# Patient Record
Sex: MALE | Race: WHITE
[De-identification: names, ages, dates, MRNs, and addresses within clinical notes are randomized per-mention and may not be internally consistent; named-entity substitution may affect disease eponyms.]

---

## 2018-01-22 ENCOUNTER — HOSPITAL ENCOUNTER (EMERGENCY)
Dept: HOSPITAL 26 - MED | Age: 78
Discharge: HOME | End: 2018-01-22
Payer: COMMERCIAL

## 2018-01-22 VITALS — WEIGHT: 162.25 LBS | HEIGHT: 65 IN | BODY MASS INDEX: 27.03 KG/M2

## 2018-01-22 VITALS — SYSTOLIC BLOOD PRESSURE: 128 MMHG | DIASTOLIC BLOOD PRESSURE: 87 MMHG

## 2018-01-22 VITALS — SYSTOLIC BLOOD PRESSURE: 198 MMHG | DIASTOLIC BLOOD PRESSURE: 92 MMHG
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
## 2018-01-22 DIAGNOSIS — I10: ICD-10-CM
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
## 2018-01-22 DIAGNOSIS — I48.91: ICD-10-CM

## 2018-01-22 DIAGNOSIS — M54.5: Primary | ICD-10-CM
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
## 2018-01-22 PROCEDURE — 99284 EMERGENCY DEPT VISIT MOD MDM: CPT

## 2018-01-22 PROCEDURE — 96372 THER/PROPH/DIAG INJ SC/IM: CPT

## 2018-01-22 PROCEDURE — 72100 X-RAY EXAM L-S SPINE 2/3 VWS: CPT

## 2018-01-22 NOTE — NUR
77/M BIB FAMILY C/O MID BACK PAIN 10/10 POST TRIP AND FALL 1/21/18,  DENIES 
LOC.DENIES N/V. AAOX4. LUNGS CLEAR BL; PATIENT STATES PAIN OF 10/10 AT THIS 
TIME; PATIENT POSITIONED FOR COMFORT; HOB ELEVATED; BEDRAILS UP X2; BED DOWN. 
ER MD MADE AWARE OF PT STATUS.

## 2018-01-22 NOTE — NUR
Patient discharged with v/s stable. Written and verbal after care instructions 
given and explained. 

Patient alert, oriented and verbalized understanding of instructions. 
Ambulatory with steady gait. All questions addressed prior to discharge. ID 
band removed. Patient advised to follow up with PMD. Rx of tramadol, bobaxin & 
motrin given. Patient educated on indication of medication including possible 
reaction and side effects. Opportunity to ask questions provided and answered.

## 2018-12-22 ENCOUNTER — HOSPITAL ENCOUNTER (EMERGENCY)
Dept: HOSPITAL 26 - MED | Age: 78
Discharge: HOME | End: 2018-12-22
Payer: COMMERCIAL

## 2018-12-22 VITALS — DIASTOLIC BLOOD PRESSURE: 73 MMHG | SYSTOLIC BLOOD PRESSURE: 130 MMHG

## 2018-12-22 VITALS — BODY MASS INDEX: 28 KG/M2 | WEIGHT: 158 LBS | HEIGHT: 63 IN

## 2018-12-22 DIAGNOSIS — I10: ICD-10-CM

## 2018-12-22 DIAGNOSIS — I48.91: ICD-10-CM

## 2018-12-22 DIAGNOSIS — M75.41: Primary | ICD-10-CM

## 2018-12-22 PROCEDURE — 99283 EMERGENCY DEPT VISIT LOW MDM: CPT

## 2018-12-22 PROCEDURE — 73030 X-RAY EXAM OF SHOULDER: CPT

## 2018-12-22 PROCEDURE — 96372 THER/PROPH/DIAG INJ SC/IM: CPT

## 2018-12-22 NOTE — NUR
c/o persistant right shoulder pain x2 wks notale soft tissue swelling right 
clavicular/neck area

no subcutaneous Emphysema palpable

denies recent injury---unable to lift rue about chest level. DENIES N/V/D; SKIN 
IS PINK/WARM/DRY; AAOX4 WITH EVEN AND STEADY GAIT; LUNGS CLEAR BL; HR EVEN AND 
REGULAR; PT DENIES ANY FEVER, CP, SOB, OR COUGH AT THIS TIME; PATIENT STATES 
PAIN OF 10/10 AT THIS TIME; VSS; PATIENT POSITIONED FOR COMFORT; HOB ELEVATED; 
BEDRAILS UP X2; BED DOWN. ER MD MADE AWARE OF PT STATUS.

## 2018-12-22 NOTE — NUR
Patient discharged with v/s stable. Written and verbal after care instructions 
given and explained. 

Patient alert, oriented and verbalized understanding of instructions. 
Ambulatory with steady gait. All questions addressed prior to discharge. ID 
band removed. Patient advised to follow up with PMD. Rx of voltaren given. 
Patient educated on indication of medication including possible reaction and 
side effects. Opportunity to ask questions provided and answered.

## 2019-04-06 ENCOUNTER — HOSPITAL ENCOUNTER (INPATIENT)
Dept: HOSPITAL 26 - MED | Age: 79
LOS: 3 days | Discharge: HOME HEALTH | DRG: 682 | End: 2019-04-09
Payer: COMMERCIAL

## 2019-04-06 VITALS — SYSTOLIC BLOOD PRESSURE: 123 MMHG | DIASTOLIC BLOOD PRESSURE: 66 MMHG

## 2019-04-06 VITALS — DIASTOLIC BLOOD PRESSURE: 79 MMHG | SYSTOLIC BLOOD PRESSURE: 140 MMHG

## 2019-04-06 VITALS — WEIGHT: 160 LBS | HEIGHT: 65 IN | BODY MASS INDEX: 26.66 KG/M2

## 2019-04-06 DIAGNOSIS — E86.0: ICD-10-CM

## 2019-04-06 DIAGNOSIS — I48.2: ICD-10-CM

## 2019-04-06 DIAGNOSIS — J44.0: ICD-10-CM

## 2019-04-06 DIAGNOSIS — I12.9: ICD-10-CM

## 2019-04-06 DIAGNOSIS — N17.9: Primary | ICD-10-CM

## 2019-04-06 DIAGNOSIS — N18.3: ICD-10-CM

## 2019-04-06 DIAGNOSIS — J18.1: ICD-10-CM

## 2019-04-06 DIAGNOSIS — J44.1: ICD-10-CM

## 2019-04-06 DIAGNOSIS — J96.00: ICD-10-CM

## 2019-04-06 LAB
ALBUMIN FLD-MCNC: 3.2 G/DL (ref 3.4–5)
ANION GAP SERPL CALCULATED.3IONS-SCNC: 12.9 MMOL/L (ref 8–16)
APPEARANCE UR: CLEAR
AST SERPL-CCNC: 25 U/L (ref 15–37)
BASOPHILS # BLD AUTO: 0 K/UL (ref 0–0.22)
BASOPHILS NFR BLD AUTO: 0.1 % (ref 0–2)
BILIRUB SERPL-MCNC: 0.6 MG/DL (ref 0–1)
BILIRUB UR QL STRIP: NEGATIVE
BUN SERPL-MCNC: 36 MG/DL (ref 7–18)
CHLORIDE SERPL-SCNC: 105 MMOL/L (ref 98–107)
CO2 SERPL-SCNC: 27.6 MMOL/L (ref 21–32)
COLOR UR: YELLOW
CREAT SERPL-MCNC: 1.7 MG/DL (ref 0.7–1.3)
EOSINOPHIL # BLD AUTO: 0 K/UL (ref 0–0.4)
EOSINOPHIL NFR BLD AUTO: 0.1 % (ref 0–4)
ERYTHROCYTE [DISTWIDTH] IN BLOOD BY AUTOMATED COUNT: 15.2 % (ref 11.6–13.7)
GFR SERPL CREATININE-BSD FRML MDRD: (no result) ML/MIN (ref 90–?)
GLUCOSE SERPL-MCNC: 102 MG/DL (ref 74–106)
GLUCOSE UR STRIP-MCNC: NEGATIVE MG/DL
HCT VFR BLD AUTO: 40.3 % (ref 36–52)
HGB BLD-MCNC: 13.5 G/DL (ref 12–18)
HGB UR QL STRIP: (no result)
LEUKOCYTE ESTERASE UR QL STRIP: NEGATIVE
LYMPHOCYTES # BLD AUTO: 0.5 K/UL (ref 2–11.5)
LYMPHOCYTES NFR BLD AUTO: 4.6 % (ref 20.5–51.1)
MCH RBC QN AUTO: 30 PG (ref 27–31)
MCHC RBC AUTO-ENTMCNC: 33 G/DL (ref 33–37)
MCV RBC AUTO: 88.6 FL (ref 80–94)
MONOCYTES # BLD AUTO: 0.8 K/UL (ref 0.8–1)
MONOCYTES NFR BLD AUTO: 7.9 % (ref 1.7–9.3)
NEUTROPHILS # BLD AUTO: 9 K/UL (ref 1.8–7.7)
NEUTROPHILS NFR BLD AUTO: 87.3 % (ref 42.2–75.2)
NITRITE UR QL STRIP: NEGATIVE
PH UR STRIP: 6 [PH] (ref 5–9)
PLATELET # BLD AUTO: 197 K/UL (ref 140–450)
POTASSIUM SERPL-SCNC: 3.5 MMOL/L (ref 3.5–5.1)
PROTHROMBIN TIME: 12.2 SECS (ref 10.8–13.4)
RBC # BLD AUTO: 4.55 MIL/UL (ref 4.2–6.1)
RBC #/AREA URNS HPF: (no result) /HPF (ref 0–5)
SODIUM SERPL-SCNC: 142 MMOL/L (ref 136–145)
WBC # BLD AUTO: 10.3 K/UL (ref 4.8–10.8)
WBC,URINE: (no result) /HPF (ref 0–5)

## 2019-04-06 RX ADMIN — ALBUTEROL SULFATE PRN MG: 2.5 SOLUTION RESPIRATORY (INHALATION) at 20:45

## 2019-04-06 RX ADMIN — DEXTROSE SCH MLS/HR: 50 INJECTION, SOLUTION INTRAVENOUS at 18:25

## 2019-04-06 RX ADMIN — MORPHINE SULFATE PRN MG: 2 INJECTION, SOLUTION INTRAMUSCULAR; INTRAVENOUS at 19:40

## 2019-04-06 NOTE — NUR
ENDORSED PATIENT AT BEDSIDE TO NIGHT SHIFT NURSE FOR CONTINUITY OF CARE. PATIENT IS IN 
STABLE CONDITION. SAFETY MEASURES ARE IN PLACE. TELE MONITOR ATTACHED

## 2019-04-06 NOTE — NUR
RECEIVED BEDSIDE REPORT FROM COREY BARRIOS, PATIENT IN BED ON 2 L NC, O2SAT 100%. IV IN RIGHT AC 
20 G INFUSING NS  ML/HR. PATIENT ENGLISH SPEAKING, ABLE TO ANSWER ADMISSION QUESTIONS. 
C/O PAIN ON RIGHT FLANK. WILL MEDICATE ACCORDING TO MD ORDER. V/S STABLE, MRSA SCREEN 
COLLECTED AND SENT TO LAB.

## 2019-04-06 NOTE — NUR
EXPLAINED TO PATIENT FOR NEED FOR URINE SAMPLE, PATIENT VERBALIZED UNDERSTANDING. PLACED 
COLLECTION CUP NEAR BEDSIDE.

## 2019-04-06 NOTE — NUR
PT BIB DAUGHTER FOR PNEUMONIA. PT REPORTS BEING AT URGENT CARE THIS MORNING AND 
WAS REFFERRED TO ER FOR PNEUMONIA AFTER RECIEVING AN ABX INJECTION, PT UNABLE 
TO NAME WHAT ABX HE RECIEVED. PT REPORTS "NOT FEELING WELL FOR A COUPLE OF 
MONTHS". PT STATES HE FEELS SOB, O2 SAT AT 96% ON 2L VIA NC, RR EVEN, 
NON-LABORED, AND CRACKLES PRESENT IN RT LOWER LUNG. PT REPORTS R SIDED CP AT 
5/10 THAT INCREASES WITH BREATHING. PT HAS CURRENT TEMP .4, COOLING 
MEASURES TAKEN, TYLENOL HAS BEEN GIVEN. PT DENIES N/V/D. VSS. ER MD TO SEE PT.



MEDHX:HTN, GLAUCOMA, A-FIB

## 2019-04-06 NOTE — NUR
FAMILY AT BEDSIDE. RECEIVED PATIENT ON 2L NASAL CANNULA, PULSE OX SAT 99%. TITRATED OXYGEN 
TO MAINTAIN  SPO2 >90% AS PER ORDER. O2 TITRATED TO 1L, PULSE OX SAT 99%. BREATH SOUNDS 
COARSE. PATIENT DEMONSTRATES STRONG COUGH EFFORT. SPONTANEOUSLY EXPECTORATED SMALL AMOUNT OF 
THICK, YELLOW SECRETIONS. ORAL YANKAUER SUCTIONING SET UP FOR PATIENT. PATIENT REQUESTING 
BREATHING TREATMENT. PRN BREATHING TX ADMINISTERED. TOLERATED TX WELL, NO ADVERSE SIDE 
EFFECTS. NO RESPIRATORY DISTRESS NOTED AT THIS TIME. WILL CONTINUE TO MONITOR.

## 2019-04-06 NOTE — NUR
PATIENT ARRIVED TO THE UNIT VIA GURNEY ACCOMPANIED WITH ER NURSE. AOX4 AND COOPERATIVE. 
DENIES PAIN. RESPIRATION EVEN AND UNLABORED. ON 2LPM NASAL CANNULA. NO SIGNS OF DISTRESS 
NOTED. IV ON L AC 20G, INTACT AND DRY, INFUSING AS PER MD ORDER. SKIN INTACT AND DRY. ABLE 
TO AMBULATE. CONTINENT. ORIENTED PATIENT TO THE ROOM, ON HOW TO USE THE CALL LIGHT FOR 
ASSISTANCE, TV, BED REMOTE, AND BATHROOM. PATIENT VERBALIZED UNDERSTANDING. DISCUSSES PLAN 
OF CARE WITH PATIENT AND PATIENT WAS AWARE. VITAL SIGNS TAKEN; TEMP 99.1, /66, PULSE 
67, SPO2 97% ON 2LPM NC, RR 20, DENIES PAIN. MRSA NARES SWAP DONE. TELE MONITOR ATTACHED. 
INSTRUCTED PATIENT TO USE THE CALL LIGHT FOR ANY ASSISTANCE AND PATIENT ACKNOWLEDGED. BED IN 
LOW POSITION AND CALL LIGHT WITHIN REACH.

## 2019-04-06 NOTE — NUR
Patient will be admitted to care of [facility]. Admited to TELE VIA GURANDEE W/ VSS.  
Will go to room 121B. Belongings list completed.

## 2019-04-06 NOTE — NUR
CNA CHANGED PATIENT INTO YELLOW GOWN, YELLOW SOCKS AND PUT ON WRIST BAND. FALL RISK SIGN 
POSTED AND ALARM ON. INSTRUCTED PATIENT TO USE THE CALL LIGHT FOR ANY ASSISTANCE. PATIENT 
VERBALIZED UNDERSTANDING. TELE MONITOR ATTACHED. BED IN LOW POSITION AND CALL LIGHT WITHIN 
REACH.

## 2019-04-06 NOTE — NUR
DUE ZITHROMAX GIVEN EDUCATION ABOUT SIDE EFFECTS EXPLAINED TO PATIENT AND DAUGHTER. 
MEDICATED WITH MORPHINE FOR PAIN 8/10 IN RIGHT FLANK AREA.

## 2019-04-06 NOTE — NUR
PT STILL GOING IN AND OUT OF A-FIB WITH RVR. ER MD NOTIFIED, ADVISED TO GIVE 
MORE CARDIZEM. PT REPORTS CP AT 5/10 AT THIS TIME

## 2019-04-07 VITALS — SYSTOLIC BLOOD PRESSURE: 119 MMHG | DIASTOLIC BLOOD PRESSURE: 58 MMHG

## 2019-04-07 VITALS — DIASTOLIC BLOOD PRESSURE: 64 MMHG | SYSTOLIC BLOOD PRESSURE: 110 MMHG

## 2019-04-07 VITALS — SYSTOLIC BLOOD PRESSURE: 119 MMHG | DIASTOLIC BLOOD PRESSURE: 52 MMHG

## 2019-04-07 VITALS — DIASTOLIC BLOOD PRESSURE: 68 MMHG | SYSTOLIC BLOOD PRESSURE: 126 MMHG

## 2019-04-07 VITALS — SYSTOLIC BLOOD PRESSURE: 118 MMHG | DIASTOLIC BLOOD PRESSURE: 64 MMHG

## 2019-04-07 VITALS — DIASTOLIC BLOOD PRESSURE: 90 MMHG | SYSTOLIC BLOOD PRESSURE: 115 MMHG

## 2019-04-07 VITALS — SYSTOLIC BLOOD PRESSURE: 135 MMHG | DIASTOLIC BLOOD PRESSURE: 80 MMHG

## 2019-04-07 LAB
ALBUMIN FLD-MCNC: 2.5 G/DL (ref 3.4–5)
ANION GAP SERPL CALCULATED.3IONS-SCNC: 15.8 MMOL/L (ref 8–16)
AST SERPL-CCNC: 27 U/L (ref 15–37)
BASOPHILS # BLD AUTO: 0 K/UL (ref 0–0.22)
BASOPHILS NFR BLD AUTO: 0.2 % (ref 0–2)
BILIRUB SERPL-MCNC: 0.4 MG/DL (ref 0–1)
BUN SERPL-MCNC: 35 MG/DL (ref 7–18)
CHLORIDE SERPL-SCNC: 105 MMOL/L (ref 98–107)
CO2 SERPL-SCNC: 23.4 MMOL/L (ref 21–32)
CREAT SERPL-MCNC: 1.8 MG/DL (ref 0.7–1.3)
EOSINOPHIL # BLD AUTO: 0 K/UL (ref 0–0.4)
EOSINOPHIL NFR BLD AUTO: 0 % (ref 0–4)
ERYTHROCYTE [DISTWIDTH] IN BLOOD BY AUTOMATED COUNT: 15 % (ref 11.6–13.7)
GFR SERPL CREATININE-BSD FRML MDRD: (no result) ML/MIN (ref 90–?)
GLUCOSE SERPL-MCNC: 106 MG/DL (ref 74–106)
HCT VFR BLD AUTO: 37.9 % (ref 36–52)
HGB BLD-MCNC: 13 G/DL (ref 12–18)
LYMPHOCYTES # BLD AUTO: 0.5 K/UL (ref 2–11.5)
LYMPHOCYTES NFR BLD AUTO: 5.6 % (ref 20.5–51.1)
MCH RBC QN AUTO: 30 PG (ref 27–31)
MCHC RBC AUTO-ENTMCNC: 34 G/DL (ref 33–37)
MCV RBC AUTO: 88.7 FL (ref 80–94)
MONOCYTES # BLD AUTO: 0.8 K/UL (ref 0.8–1)
MONOCYTES NFR BLD AUTO: 8.6 % (ref 1.7–9.3)
NEUTROPHILS # BLD AUTO: 7.7 K/UL (ref 1.8–7.7)
NEUTROPHILS NFR BLD AUTO: 85.6 % (ref 42.2–75.2)
PLATELET # BLD AUTO: 167 K/UL (ref 140–450)
POTASSIUM SERPL-SCNC: 3.2 MMOL/L (ref 3.5–5.1)
RBC # BLD AUTO: 4.27 MIL/UL (ref 4.2–6.1)
SODIUM SERPL-SCNC: 141 MMOL/L (ref 136–145)
WBC # BLD AUTO: 9 K/UL (ref 4.8–10.8)

## 2019-04-07 RX ADMIN — DILTIAZEM HYDROCHLORIDE SCH MG: 30 TABLET, FILM COATED ORAL at 23:31

## 2019-04-07 RX ADMIN — ALBUTEROL SULFATE SCH MG: 2.5 SOLUTION RESPIRATORY (INHALATION) at 20:54

## 2019-04-07 RX ADMIN — ALBUTEROL SULFATE SCH MG: 2.5 SOLUTION RESPIRATORY (INHALATION) at 13:51

## 2019-04-07 RX ADMIN — ENOXAPARIN SODIUM SCH MG: 30 INJECTION SUBCUTANEOUS at 08:35

## 2019-04-07 RX ADMIN — DILTIAZEM HYDROCHLORIDE SCH MG: 30 TABLET, FILM COATED ORAL at 08:32

## 2019-04-07 RX ADMIN — SODIUM CHLORIDE SCH MLS/HR: 9 INJECTION, SOLUTION INTRAVENOUS at 14:20

## 2019-04-07 RX ADMIN — DILTIAZEM HYDROCHLORIDE SCH MG: 30 TABLET, FILM COATED ORAL at 12:26

## 2019-04-07 RX ADMIN — DILTIAZEM HYDROCHLORIDE SCH MG: 30 TABLET, FILM COATED ORAL at 18:00

## 2019-04-07 RX ADMIN — ALBUTEROL SULFATE PRN MG: 2.5 SOLUTION RESPIRATORY (INHALATION) at 09:46

## 2019-04-07 RX ADMIN — ACETAMINOPHEN PRN MG: 325 TABLET ORAL at 04:41

## 2019-04-07 RX ADMIN — ACETAMINOPHEN PRN MG: 325 TABLET ORAL at 23:31

## 2019-04-07 RX ADMIN — DEXTROSE SCH MLS/HR: 50 INJECTION, SOLUTION INTRAVENOUS at 18:52

## 2019-04-07 RX ADMIN — ACETAMINOPHEN PRN MG: 325 TABLET ORAL at 12:27

## 2019-04-07 NOTE — NUR
PT DAUGHTER AT THE BEDSIDE. UPDATED ON PT CONDITION HELPED WITH SUCTION. PT STABLE AT THIS 
TIME. NO SIGN OF DISTRESS NOTED. WILL CONTINUE TO MONITOR PT.

## 2019-04-07 NOTE — NUR
CHECKED ON THE PT. SITTING ON HIS BED. FRIEND AT THE BEDSIDE. PT DENIES ANY PAIN OR 
DISTRESS. PT ON O2 3LPM AT THIS TIME. CALL LIGHT WITHIN PT REACH. NO SIGN OF DISTRESS NOTED. 
WILL CONTINUE TO MONITOR PT.

## 2019-04-07 NOTE — NUR
RECEIVED PT ON HIGH FOWLERS POSITION, AAOX4, VITAL SIGNS STABLE, SAT-95% ON 3L NASAL 
CANNULA, OCCASIONAL COUGH NOTED, ZITHROMAX IVPB INFUSING WELL, COARSE LUNG SOUNDS, PLAN OF 
CARE DISCUSSED, SAFETY MEASURES IN PLACE, CALL LIGHT WITHIN REACH.

## 2019-04-07 NOTE — NUR
RECEIVED REPORT FROM PM NURSE AT BEDSIDE. PT LYING ON HIS BED. ON O2 LPM VIA NC. NO SIGN OF 
DISTRESS NOTED. PT ON FALL RISK PRECAUTION. CALL LIGHT WITHIN PT REACH. HAS IV AC 20 G, IVF 
TKO. SKIN IS INTACT. INFORMED TO USE CALL LIGHT FOR ANY HELP. BED ALARM ON. WILL CONTINUE TO 
MONITOR PT.

## 2019-04-07 NOTE — NUR
PATIENT PRESENTING WITH INCREASED SOB POST AMBULATING TO RESTROOM WITHOUT SUPPLEMENTAL 
OXYGEN VIA NC POST HHN THERAPY PLACED BACK ON SUPPLEMENTAL OXYGEN AT 3 LPM VIA NC ADDED 
TUBING EXTENSION X 2 EDUCATION PROVIDED TO PATIENT ON RETAINING NC WHILE AMBULATING SITAL/RN 
NOTIFIED

## 2019-04-07 NOTE — NUR
HR IN 'S 'S SPOKE WITH DR LOPEZ RECEIVED ORDERS FOR CARDIZEM IVP 10 MG ONE TIME 
AND CARDIZEM PO 30 MG Q6H AFTER. WILL FOLLOW WITH MD ORDERS.

## 2019-04-07 NOTE — NUR
ADMINISTERED MEDS TO PT AS ORDERED. ASSISTED PT TO RESTROOM, SITTING ON HIS CHAIR NOW. PT 
HAS HIGH HR. ADMINISTERED CARDIZEM AS ORDERED. CALL LIGHT WITHIN PT REACH. INFORMED TO USE 
CALL LIGHT FOR ANY HELP. WILL CONTINUE TO MONITOR PT.

## 2019-04-07 NOTE — NUR
PT AMBULATED TO BR WITH STANDBY ASSIST AND VOIDED FREELY, SOB ON EXERTION, MAINTAIN ON O2 AT 
2L/NC, HOB ELEVATED FOR COMFORT, STATED FEELING BETTER WHEN AT REST, MONITORED CLOSELY.

## 2019-04-07 NOTE — NUR
RECEIVED PATIENT ON 3L NASAL CANNULA, PULSE OX SAT 96%. SCHEDULED BREATHING TREATMENT 
ADMINISTERED. TOLERATED TX WELL, NO ADVERSE SIDE EFFECTS. PATIENT ORALLY SUCTIONING HIMSELF 
USING YANKAUER. PATIENT SUCTIONED LARGE AMOUNT OF THICK, BROWN SECRETIONS. YANKAUER 
SUCTIONING EQUIPMENT CLEAN AND CHANGED. PLACED PATIENT BACK ON NASAL CANNULA. TITRATED 
OXYGEN TO 2L, PULSE OX 97%. RN AWARE. WILL MONITOR AND MAINTAIN SPO2 >92%. NO RESPIRATORY 
DISTRESS NOTED AT THIS TIME. WILL CONTINUE TO MONITOR.

## 2019-04-07 NOTE — NUR
PATIENT SITTING UP IN CHAIR OFF SUPPLEMENTAL OXYGEN POST HHN THERAPY PLACED BACK ON 
SUPPLEMENTAL OXYGEN AT 2 LPM VIA NC

## 2019-04-07 NOTE — NUR
SATURATION 92% ON SUPPLEMENTAL OXYGEN AT 2 LPM VIA NC INCREASED FIO2 TO 3 LPM TO KEEP 
SATURATION GREATER THAN 92% SITEL/RN NOTIFIED

## 2019-04-07 NOTE — NUR
CHECKED ON THE PT. BACK TO HIS BED. PT ON O2 VIA NC 3LPM. ATE HIS BREAKFAST. DENIES ANY 
DISTRESS OR PAIN. BED ALARM ON. PT LYING COMFORTABLY IN HIS BED. CALL LIGHT WITHIN PT REACH. 
WILL CONTINUE TO MONITOR PT.

## 2019-04-08 VITALS — SYSTOLIC BLOOD PRESSURE: 123 MMHG | DIASTOLIC BLOOD PRESSURE: 63 MMHG

## 2019-04-08 VITALS — SYSTOLIC BLOOD PRESSURE: 124 MMHG | DIASTOLIC BLOOD PRESSURE: 57 MMHG

## 2019-04-08 VITALS — SYSTOLIC BLOOD PRESSURE: 119 MMHG | DIASTOLIC BLOOD PRESSURE: 67 MMHG

## 2019-04-08 VITALS — SYSTOLIC BLOOD PRESSURE: 133 MMHG | DIASTOLIC BLOOD PRESSURE: 79 MMHG

## 2019-04-08 VITALS — DIASTOLIC BLOOD PRESSURE: 77 MMHG | SYSTOLIC BLOOD PRESSURE: 125 MMHG

## 2019-04-08 VITALS — DIASTOLIC BLOOD PRESSURE: 57 MMHG | SYSTOLIC BLOOD PRESSURE: 98 MMHG

## 2019-04-08 LAB
ANION GAP SERPL CALCULATED.3IONS-SCNC: 12.6 MMOL/L (ref 8–16)
BASOPHILS # BLD AUTO: 0 K/UL (ref 0–0.22)
BASOPHILS NFR BLD AUTO: 0.2 % (ref 0–2)
BUN SERPL-MCNC: 38 MG/DL (ref 7–18)
CHLORIDE SERPL-SCNC: 104 MMOL/L (ref 98–107)
CO2 SERPL-SCNC: 25.8 MMOL/L (ref 21–32)
CREAT SERPL-MCNC: 1.8 MG/DL (ref 0.7–1.3)
EOSINOPHIL # BLD AUTO: 0 K/UL (ref 0–0.4)
EOSINOPHIL NFR BLD AUTO: 0.2 % (ref 0–4)
ERYTHROCYTE [DISTWIDTH] IN BLOOD BY AUTOMATED COUNT: 15.3 % (ref 11.6–13.7)
GFR SERPL CREATININE-BSD FRML MDRD: (no result) ML/MIN (ref 90–?)
GLUCOSE SERPL-MCNC: 120 MG/DL (ref 74–106)
HCT VFR BLD AUTO: 37.1 % (ref 36–52)
HGB BLD-MCNC: 12.5 G/DL (ref 12–18)
LYMPHOCYTES # BLD AUTO: 0.6 K/UL (ref 2–11.5)
LYMPHOCYTES NFR BLD AUTO: 7.8 % (ref 20.5–51.1)
MCH RBC QN AUTO: 30 PG (ref 27–31)
MCHC RBC AUTO-ENTMCNC: 34 G/DL (ref 33–37)
MCV RBC AUTO: 88.3 FL (ref 80–94)
MONOCYTES # BLD AUTO: 0.5 K/UL (ref 0.8–1)
MONOCYTES NFR BLD AUTO: 6.8 % (ref 1.7–9.3)
NEUTROPHILS # BLD AUTO: 6.2 K/UL (ref 1.8–7.7)
NEUTROPHILS NFR BLD AUTO: 85 % (ref 42.2–75.2)
PLATELET # BLD AUTO: 165 K/UL (ref 140–450)
POTASSIUM SERPL-SCNC: 3.4 MMOL/L (ref 3.5–5.1)
RBC # BLD AUTO: 4.21 MIL/UL (ref 4.2–6.1)
SODIUM SERPL-SCNC: 139 MMOL/L (ref 136–145)
WBC # BLD AUTO: 7.3 K/UL (ref 4.8–10.8)

## 2019-04-08 RX ADMIN — DILTIAZEM HYDROCHLORIDE SCH MG: 30 TABLET, FILM COATED ORAL at 12:19

## 2019-04-08 RX ADMIN — ACETAMINOPHEN PRN MG: 325 TABLET ORAL at 10:56

## 2019-04-08 RX ADMIN — SODIUM CHLORIDE SCH MLS/HR: 9 INJECTION, SOLUTION INTRAVENOUS at 05:09

## 2019-04-08 RX ADMIN — ALBUTEROL SULFATE SCH MG: 2.5 SOLUTION RESPIRATORY (INHALATION) at 13:00

## 2019-04-08 RX ADMIN — DILTIAZEM HYDROCHLORIDE SCH MG: 30 TABLET, FILM COATED ORAL at 17:30

## 2019-04-08 RX ADMIN — TIMOLOL MALEATE SCH DROP: 5 SOLUTION OPHTHALMIC at 21:22

## 2019-04-08 RX ADMIN — ENOXAPARIN SODIUM SCH MG: 30 INJECTION SUBCUTANEOUS at 09:35

## 2019-04-08 RX ADMIN — DEXTROSE SCH MLS/HR: 50 INJECTION, SOLUTION INTRAVENOUS at 17:33

## 2019-04-08 RX ADMIN — SODIUM CHLORIDE SCH MLS/HR: 9 INJECTION, SOLUTION INTRAVENOUS at 14:31

## 2019-04-08 RX ADMIN — DILTIAZEM HYDROCHLORIDE SCH MG: 30 TABLET, FILM COATED ORAL at 05:36

## 2019-04-08 RX ADMIN — ALBUTEROL SULFATE SCH MG: 2.5 SOLUTION RESPIRATORY (INHALATION) at 08:54

## 2019-04-08 RX ADMIN — Medication SCH MG: at 21:11

## 2019-04-08 RX ADMIN — ALBUTEROL SULFATE SCH MG: 2.5 SOLUTION RESPIRATORY (INHALATION) at 19:29

## 2019-04-08 RX ADMIN — ALBUTEROL SULFATE SCH MG: 2.5 SOLUTION RESPIRATORY (INHALATION) at 01:45

## 2019-04-08 RX ADMIN — BRIMONIDINE TARTRATE SCH DROP: 2 SOLUTION/ DROPS OPHTHALMIC at 21:19

## 2019-04-08 RX ADMIN — ACETAMINOPHEN PRN MG: 325 TABLET ORAL at 17:30

## 2019-04-08 NOTE — NUR
PT HR STILL CYCLES BETWEEN 140'SBPM A FIB TO 50'S BPM SB/SR, BP-98/51, SAT-95%, TEMP-98.7, 
PT AROUSABLE TO NAME AND VERBALLY RESPONSIVE, DENIES ANY PAIN AND NO SOB NOTED, MONITORED 
CLOSELY.

## 2019-04-08 NOTE — NUR
CALLED DR VANG, MADE HIM AWARE PT HAS UNCONTROLLED AFIB HR -150. SOMETIMES 
SWITCHES BACK TO NSR HR. PT ALREADY HAD CARDIZEM 30MG Q6H. DR TREVIÑO METOPROLOL 25MG BID, 
WILL GIVE ONE DOSE NOW.

## 2019-04-08 NOTE — NUR
RECEIVED PT REPORT FROM NIGHT SHIFT RN. PT IS AAOX4, NO S/S OF ACUTE DISTRESS ON 2L NASAL 
CANNULA. PT ABLE TO USE YANKAUER TO SUCTION HIS SPUTUM. PT HAS PRODUCTIVE COUGH. LUNG SOUNDS 
COARSE. PLAN OF CARE DISCUSSED, BED IN LOWEST POSITION, SAFETY MEASURES IN PLACE, CALL LIGHT 
WITHIN REACH.

## 2019-04-08 NOTE — NUR
PT HAD ANOTHER EPISODE OF HR WENT UP 'S BPM A FIB THEN GOES DOWN TO 60'S BPM SINUS 
RHYTHM, PT SLEEPING, EASILY AROUSABLE, DENIES PAIN AND NO SOB NOTED, MONITORED CLOSELY.

## 2019-04-08 NOTE — NUR
SEEN PT AWAKE, ALERT AND ORIENTED. INITIAL ASSESSMENT DONE. VITAL SIGNS CHECKED. PT 
COMPLAINING OF HIS IV. PT SAID TO REMOVE IT. IV TUBING DISCONNECTED AND WILL INSERT NEW IV. 
PT DENIES ANY PAIN AT THIS TIME. SAFETY REINFORCED. CALL LIGHT W/IN REACH. WILL CONTINUE TO 
MONITOR.

-------------------------------------------------------------------------------

Addendum: 04/09/19 at 0213 by Louise Silva RN

-------------------------------------------------------------------------------

DOCUMENTATION AT 1950

## 2019-04-08 NOTE — NUR
PT AMBULATED TO BR WITH STEADY GAIT, VOIDED FREELY, PT THEN WASHED HIS FACE AND BRUSHED HIS 
TEETH, PT WENT BACK TO BED, SOB ON EXERTION, MAINTAIN ON HIGH FOWLERS POSITION, O2 AT 2L 
NASAL CANNULA, UNCONTROLLED A-FIB IN 'S, DENIES CHEST PAIN, NO SOB NOTED WHEN AT 
REST, MONITORED CLOSELY.

## 2019-04-08 NOTE — NUR
PT HR WENT -150 BPM AND SEVERAL MINUTES LATER WILL GO BACK DOWN TO 70-90 BPM, 
TEMP-98.9, BP-94/62, ASYMPTOMATIC, PT AWAKE AND VERBALLY RESPONSIVE, NO SOB NOTED, HR CYCLES 
LIKE THIS FOR SEVERAL MINUTES AND THEN STABILIZED ON 70-80 BPM, MONITORED CLOSELY.

## 2019-04-08 NOTE — NUR
PT GOT BACK FROM THE BATHROOM. MEDICATIONS GIVEN W/ TEACHINGS. NEW IV INSERTED ON LEFT WRIST 
G22 W/ GOOD BLOOD RETURN. IVF RESUMED AS ORDERED. PT DENIES ANY OTHER NEEDS. NEW YANKEUR FOR 
SUCTION GIVEN.

## 2019-04-08 NOTE — NUR
SCHEDULED BREATHING TREATMENT ADMINISTERED. TOLERATED TX WELL, NO ADVERSE SIDE EFFECTS.  
PLACED BACK ON 2L NASAL CANNULA POST TX. NO RESPIRATORY DISTRESS NOTED AT THIS TIME. WILL 
CONTINUE TO MONITOR.

## 2019-04-08 NOTE — NUR
UNCONTROLLED A-FIB ON TELE WITH 131 BPM, BP-118/66, DUE CARDIZEM PO GIVEN, PT ASYMPTOMATIC, 
NO DISTRESS NOTED, MONITORED CLOSELY.

## 2019-04-09 VITALS — SYSTOLIC BLOOD PRESSURE: 121 MMHG | DIASTOLIC BLOOD PRESSURE: 62 MMHG

## 2019-04-09 VITALS — SYSTOLIC BLOOD PRESSURE: 121 MMHG | DIASTOLIC BLOOD PRESSURE: 61 MMHG

## 2019-04-09 VITALS — DIASTOLIC BLOOD PRESSURE: 79 MMHG | SYSTOLIC BLOOD PRESSURE: 133 MMHG

## 2019-04-09 VITALS — DIASTOLIC BLOOD PRESSURE: 91 MMHG | SYSTOLIC BLOOD PRESSURE: 144 MMHG

## 2019-04-09 LAB
ANION GAP SERPL CALCULATED.3IONS-SCNC: 16.9 MMOL/L (ref 8–16)
BASOPHILS # BLD AUTO: 0 K/UL (ref 0–0.22)
BASOPHILS NFR BLD AUTO: 0.2 % (ref 0–2)
BUN SERPL-MCNC: 32 MG/DL (ref 7–18)
CHLORIDE SERPL-SCNC: 103 MMOL/L (ref 98–107)
CO2 SERPL-SCNC: 23.9 MMOL/L (ref 21–32)
CREAT SERPL-MCNC: 1.5 MG/DL (ref 0.7–1.3)
EOSINOPHIL # BLD AUTO: 0.1 K/UL (ref 0–0.4)
EOSINOPHIL NFR BLD AUTO: 1 % (ref 0–4)
ERYTHROCYTE [DISTWIDTH] IN BLOOD BY AUTOMATED COUNT: 15.3 % (ref 11.6–13.7)
GFR SERPL CREATININE-BSD FRML MDRD: (no result) ML/MIN (ref 90–?)
GLUCOSE SERPL-MCNC: 99 MG/DL (ref 74–106)
HCT VFR BLD AUTO: 38.4 % (ref 36–52)
HGB BLD-MCNC: 12.8 G/DL (ref 12–18)
LYMPHOCYTES # BLD AUTO: 0.9 K/UL (ref 2–11.5)
LYMPHOCYTES NFR BLD AUTO: 13.9 % (ref 20.5–51.1)
MCH RBC QN AUTO: 29 PG (ref 27–31)
MCHC RBC AUTO-ENTMCNC: 33 G/DL (ref 33–37)
MCV RBC AUTO: 88.1 FL (ref 80–94)
MONOCYTES # BLD AUTO: 0.6 K/UL (ref 0.8–1)
MONOCYTES NFR BLD AUTO: 8.8 % (ref 1.7–9.3)
NEUTROPHILS # BLD AUTO: 5.1 K/UL (ref 1.8–7.7)
NEUTROPHILS NFR BLD AUTO: 76.1 % (ref 42.2–75.2)
PLATELET # BLD AUTO: 210 K/UL (ref 140–450)
POTASSIUM SERPL-SCNC: 2.8 MMOL/L (ref 3.5–5.1)
RBC # BLD AUTO: 4.36 MIL/UL (ref 4.2–6.1)
SODIUM SERPL-SCNC: 141 MMOL/L (ref 136–145)
WBC # BLD AUTO: 6.7 K/UL (ref 4.8–10.8)

## 2019-04-09 RX ADMIN — DILTIAZEM HYDROCHLORIDE SCH MG: 30 TABLET, FILM COATED ORAL at 02:17

## 2019-04-09 RX ADMIN — DILTIAZEM HYDROCHLORIDE SCH MG: 30 TABLET, FILM COATED ORAL at 00:00

## 2019-04-09 RX ADMIN — ALBUTEROL SULFATE SCH MG: 2.5 SOLUTION RESPIRATORY (INHALATION) at 01:47

## 2019-04-09 RX ADMIN — POTASSIUM CHLORIDE SCH MEQ: 750 TABLET, FILM COATED, EXTENDED RELEASE ORAL at 14:03

## 2019-04-09 RX ADMIN — ENOXAPARIN SODIUM SCH MG: 30 INJECTION SUBCUTANEOUS at 09:02

## 2019-04-09 RX ADMIN — BRIMONIDINE TARTRATE SCH DROP: 2 SOLUTION/ DROPS OPHTHALMIC at 09:00

## 2019-04-09 RX ADMIN — SODIUM CHLORIDE SCH MLS/HR: 9 INJECTION, SOLUTION INTRAVENOUS at 11:54

## 2019-04-09 RX ADMIN — POTASSIUM CHLORIDE SCH MEQ: 750 TABLET, FILM COATED, EXTENDED RELEASE ORAL at 10:08

## 2019-04-09 RX ADMIN — DILTIAZEM HYDROCHLORIDE SCH MG: 30 TABLET, FILM COATED ORAL at 12:00

## 2019-04-09 RX ADMIN — ALBUTEROL SULFATE SCH MG: 2.5 SOLUTION RESPIRATORY (INHALATION) at 06:55

## 2019-04-09 RX ADMIN — TIMOLOL MALEATE SCH DROP: 5 SOLUTION OPHTHALMIC at 09:01

## 2019-04-09 RX ADMIN — Medication SCH MG: at 09:00

## 2019-04-09 RX ADMIN — ALBUTEROL SULFATE SCH MG: 2.5 SOLUTION RESPIRATORY (INHALATION) at 13:00

## 2019-04-09 RX ADMIN — MORPHINE SULFATE PRN MG: 2 INJECTION, SOLUTION INTRAMUSCULAR; INTRAVENOUS at 01:58

## 2019-04-09 RX ADMIN — DILTIAZEM HYDROCHLORIDE SCH MG: 30 TABLET, FILM COATED ORAL at 06:10

## 2019-04-09 NOTE — NUR
DISCHARGE INSTRUCTIONS PROVIDED TO PATIENT AND DAUGHTER DEBBIE. EDUCATED PATIENT AND DAUGHTER 
DEBBIE ON MD FOLLOW UP, MEDICATIONS REGIMEN, SIDE EFFECTS, DISEASE MANAGEMENT AND SIGN AND 
SYMPTOMS, DIET REGIMEN. ANSWERED ALL PATIENT AND DAUGHTER DEBBIE QUESTIONS, AND BOTH 
VERBALIZED UNDERSTANDING. PRESCRIPTIONS HANDED TO PATIENT. REMOVED ALL ARM BANDS, AND TELE 
MONITOR. D/C IV AND IV CANNULA INTACT, NO BLEEDING AT IV SITE, PATIENT TOLERATED WELL. 
PATIENT CHANGED INTO HIS OWN CLOTHES AND TOOK ALL HIS BELONGINGS, ESCORTED PATIENT TO THE 
LOBBY WITH THE WHEELCHAIR. PATIENT IS DISCHARGE AT THIS TIME AND IN A STABLE CONDITION.

## 2019-04-09 NOTE — NUR
RECEIVED CRITICAL LAB FOR POTASSIUM 2.8 AND CALLED DR VANG. RECEIVED TORB FROM  THAT 
40MEQ PO Q4H 2 DOSES. READ BACK ORDER AND CONFIRMED.

## 2019-04-09 NOTE — NUR
PATIENT IS SITTING UP ON BED AND TALKING TO DAUGHTER AT BEDSIDE. DENIES CHEST PAIN AND SOB. 
NO SIGNS OF DISTRESS NOTED.

## 2019-04-09 NOTE — NUR
SEEN PT ASLEEP BUT EASILY AROUSABLE. VITAL SIGNS CHECKED. PT'S HR:63. CARDIZEM HELD. PT 
DENIES ANY PAIN OR DISCOMFORT. PT DENIES ANY NEEDS. CALL LIGHT W/IN REACH.

## 2019-04-09 NOTE — NUR
SHARRI NOTE



FAXED ORDER FOR HOME HEALTH TO [facility].  PER [facility] SHARRI TENA # [phone] SHE WILL SET UP 
PATIENT'S HOME HEALTH.  CHARGE NURSE SELINA LEVIN.

## 2019-04-09 NOTE — NUR
PT'S HR IN AND OUT OF UNCONTROLLED AFIB THEN SR THEN SB. SEEN PT ASLEEP AND APPEARS 
COMFORTABLE. WILL GIVE CARDIZEM PO AS ORDERED. WILL CONTINUE TO MONITOR.

## 2019-04-09 NOTE — NUR
ADMINISTERED MEDS AS PER ORDER, PATIENT TOLERATE WELL. DENIES PAIN AND SOB. PATIENT IS 
RESTING ON BED AND WATCHING TV. INSTRUCTED PATIENT TO USE THE CALL LIGHT FOR ANY ASSISTANCE. 
SAFETY MEASURES IN PLACE. TELE MONITOR ATTACHED.

## 2019-04-09 NOTE — NUR
SEEN PT AWAKE.  VITAL SIGNS CHECKED. PT DENIES ANY DISCOMFORT. ASSISTED PT TO THE BATHROOM 
AND BACK TO BED. CALL LIGHT W/IN REACH.

## 2019-04-09 NOTE — NUR
PT GOT UP TO THE SINK TO WASH UP AND BRUSH TEETH. AFTER WHICH, MEDICATIONS GIVEN W/ 
TEACHINGS. PT VERBALIZED UNDERSTANDING.

## 2019-04-09 NOTE — NUR
RECEIVED BEDSIDE REPORT FROM NIGHT SHIFT NURSE. PATIENT IS AWAKE AND RESTING ON BED AT THIS 
TIME. AOX4. DENIES PAIN AND SOB. RESPIRATION EVEN AND UNLABORED. ON 2LPM NASAL CANNULA. NO 
SIGNS OF DISTRESS NOTED. IV ON L WRIST 22G, INTACT AND DRY, INFUSING AS PER MD ORDER. SKIN 
INTACT AND DRY. ABLE TO AMBULATE. PATIENT IS CONTINENT. URANAL AT BEDSIDE. INSTRUCTED 
PATIENT TO URINE INTO THE URANAL FOR INTAKE AND OUTPUT MEASUREMENT. DISCUSSES PLAN OF CARE 
WITH PATIENT AND PATIENT WAS AWARE. TELE MONITOR ATTACHED. INSTRUCTED PATIENT TO USE THE 
CALL LIGHT FOR ANY ASSISTANCE AND PATIENT ACKNOWLEDGED. BED IN LOW POSITION AND CALL LIGHT 
WITHIN REACH.

## 2019-04-09 NOTE — NUR
ADMINISTERED PO POTASSIUM CHLORIDE AS PER MD ORDER FOR AM LAB POTASSIUM LEVEL 2.8, PATIENT 
TOLERATED WELL. PATIENT IS SITTING ON THE CHAIR AND WATCHING TV. DENIES PAIN. NO SIGNS OF 
DISTRESS NOTED. NS CANNULA IS ON. TELE MONITOR ATTACHED.

## 2019-04-09 NOTE — NUR
PATIENT IS RESTING ON BED AT THIS TIME. DENIES PAIN AND SOB. NO SIGNS OF DISTRESS NOTED. 
DAUGHTER DEBBIE IS AT BEDSIDE. TELE MONITOR ATTACHED.

## 2019-04-09 NOTE — NUR
ADMINISTERED MEDS AS PER MD ORDER, PATIENT TOLERATED WELL. NO SIGNS OF DISTRESS NOTED. 
DAUGHTER DEBBIE IS AT BEDSIDE.

## 2020-01-22 ENCOUNTER — HOSPITAL ENCOUNTER (EMERGENCY)
Dept: HOSPITAL 26 - MED | Age: 80
Discharge: HOME | End: 2020-01-22
Payer: COMMERCIAL

## 2020-01-22 VITALS — SYSTOLIC BLOOD PRESSURE: 151 MMHG | DIASTOLIC BLOOD PRESSURE: 71 MMHG

## 2020-01-22 VITALS — WEIGHT: 160 LBS | HEIGHT: 65 IN | BODY MASS INDEX: 26.66 KG/M2

## 2020-01-22 VITALS — SYSTOLIC BLOOD PRESSURE: 143 MMHG | DIASTOLIC BLOOD PRESSURE: 67 MMHG

## 2020-01-22 DIAGNOSIS — N39.0: Primary | ICD-10-CM

## 2020-01-22 DIAGNOSIS — I10: ICD-10-CM

## 2020-01-22 DIAGNOSIS — Z79.82: ICD-10-CM

## 2020-01-22 DIAGNOSIS — Z79.899: ICD-10-CM

## 2020-01-22 DIAGNOSIS — R31.9: ICD-10-CM

## 2020-01-22 DIAGNOSIS — I48.91: ICD-10-CM

## 2020-01-22 PROCEDURE — 81002 URINALYSIS NONAUTO W/O SCOPE: CPT

## 2020-01-22 PROCEDURE — 99283 EMERGENCY DEPT VISIT LOW MDM: CPT

## 2020-01-22 PROCEDURE — 96372 THER/PROPH/DIAG INJ SC/IM: CPT

## 2020-01-22 NOTE — NUR
Patient discharged with v/s stable. Written and verbal after care instructions 
given and explained REGARDING UTI. 

Patient alert, oriented and verbalized understanding of instructions. 
Ambulatory with steady gait. All questions addressed prior to discharge. ID 
band removed. Patient advised to follow up with PMD. Rx of LEVAQUIN given. 
Patient educated on indication of medication including possible reaction and 
side effects. Opportunity to ask questions provided and answered.

IINSTRUCTED PT THAT PYRIDIUM WILL TURN URINE ORANGE/RED IN COLOR, NORMAL 
FINDING

## 2020-01-22 NOTE — NUR
PT SENT BY PMD FOR HEMATURIA AND DYSURIA X 2 DAYS. PT REPORTS 2/10 PAIN WITH 
URINATION. PT CONTINENT. DENIES ANY OTHER S/S.

PT AMBULATORY, ALERT AND AWAKE, VS STABLE.

## 2022-07-05 ENCOUNTER — HOSPITAL ENCOUNTER (EMERGENCY)
Dept: HOSPITAL 26 - MED | Age: 82
Discharge: HOME | End: 2022-07-05
Payer: COMMERCIAL

## 2022-07-05 VITALS — HEIGHT: 65 IN | WEIGHT: 163.25 LBS | BODY MASS INDEX: 27.2 KG/M2

## 2022-07-05 VITALS — SYSTOLIC BLOOD PRESSURE: 110 MMHG | DIASTOLIC BLOOD PRESSURE: 79 MMHG

## 2022-07-05 VITALS — DIASTOLIC BLOOD PRESSURE: 71 MMHG | SYSTOLIC BLOOD PRESSURE: 107 MMHG

## 2022-07-05 DIAGNOSIS — I48.91: ICD-10-CM

## 2022-07-05 DIAGNOSIS — J20.9: Primary | ICD-10-CM

## 2022-07-05 DIAGNOSIS — I10: ICD-10-CM

## 2022-07-05 DIAGNOSIS — I51.7: ICD-10-CM

## 2022-07-05 LAB
ALBUMIN FLD-MCNC: 4.2 G/DL (ref 3.4–5)
ANION GAP SERPL CALCULATED.3IONS-SCNC: 15.2 MMOL/L (ref 8–16)
AST SERPL-CCNC: 27 U/L (ref 15–37)
BASOPHILS # BLD AUTO: 0 K/UL (ref 0–0.22)
BASOPHILS NFR BLD AUTO: 0.6 % (ref 0–2)
BILIRUB SERPL-MCNC: 1.3 MG/DL (ref 0–1)
BUN SERPL-MCNC: 22 MG/DL (ref 7–18)
CHLORIDE SERPL-SCNC: 104 MMOL/L (ref 98–107)
CO2 SERPL-SCNC: 28 MMOL/L (ref 21–32)
CREAT SERPL-MCNC: 1.4 MG/DL (ref 0.6–1.3)
EOSINOPHIL # BLD AUTO: 0.1 K/UL (ref 0–0.4)
EOSINOPHIL NFR BLD AUTO: 1.1 % (ref 0–4)
ERYTHROCYTE [DISTWIDTH] IN BLOOD BY AUTOMATED COUNT: 15 % (ref 11.6–13.7)
GFR SERPL CREATININE-BSD FRML MDRD: (no result) ML/MIN (ref 90–?)
GLUCOSE SERPL-MCNC: 95 MG/DL (ref 74–106)
HCT VFR BLD AUTO: 41.4 % (ref 36–52)
HGB BLD-MCNC: 13.6 G/DL (ref 12–18)
LYMPHOCYTES # BLD AUTO: 1.6 K/UL (ref 2–11.5)
LYMPHOCYTES NFR BLD AUTO: 32.8 % (ref 20.5–51.1)
MCH RBC QN AUTO: 29 PG (ref 27–31)
MCHC RBC AUTO-ENTMCNC: 33 G/DL (ref 33–37)
MCV RBC AUTO: 88.8 FL (ref 80–94)
MONOCYTES # BLD AUTO: 0.6 K/UL (ref 0.8–1)
MONOCYTES NFR BLD AUTO: 12.1 % (ref 1.7–9.3)
NEUTROPHILS # BLD AUTO: 2.7 K/UL (ref 1.8–7.7)
NEUTROPHILS NFR BLD AUTO: 53.4 % (ref 42.2–75.2)
PLATELET # BLD AUTO: 248 K/UL (ref 140–450)
POTASSIUM SERPL-SCNC: 3.2 MMOL/L (ref 3.5–5.1)
RBC # BLD AUTO: 4.67 MIL/UL (ref 4.2–6.1)
SODIUM SERPL-SCNC: 144 MMOL/L (ref 136–145)
WBC # BLD AUTO: 5 K/UL (ref 4.8–10.8)

## 2022-07-05 NOTE — NUR
HHN THERAPY AND RESPIRATORY DRUG GIVEN AS ORDERED ENCOURAGED PATEITN FOR 
INTERMITTENT DEEP BREATH AND COUGH DURING THERAPY

## 2022-07-05 NOTE — NUR
Patient discharged with v/s stable. Written and verbal after care instructions 
given and explained. 

Patient alert, oriented and verbalized understanding of instructions. 
Ambulatory with steady gait. All questions addressed prior to discharge. ID 
band removed. Patient advised to follow up with PMD. Rx of azithromycin, 
guaifenesin, codeine syrup given. Patient educated on indication of medication 
including possible reaction and side effects. Opportunity to ask questions 
provided and answered.